# Patient Record
Sex: FEMALE | Race: WHITE | Employment: FULL TIME
[De-identification: names, ages, dates, MRNs, and addresses within clinical notes are randomized per-mention and may not be internally consistent; named-entity substitution may affect disease eponyms.]

---

## 2017-02-06 ENCOUNTER — HOSPITAL ENCOUNTER (OUTPATIENT)
Dept: PHYSICAL THERAPY | Facility: CLINIC | Age: 52
Setting detail: THERAPIES SERIES
End: 2017-02-06
Payer: COMMERCIAL

## 2017-02-06 ENCOUNTER — HOSPITAL ENCOUNTER (OUTPATIENT)
Dept: PHYSICAL THERAPY | Facility: CLINIC | Age: 52
Setting detail: THERAPIES SERIES
Discharge: HOME | End: 2017-02-06
Payer: COMMERCIAL

## 2017-02-06 PROCEDURE — 97113 AQUATIC THERAPY/EXERCISES: CPT

## 2017-02-08 ENCOUNTER — HOSPITAL ENCOUNTER (OUTPATIENT)
Dept: PHYSICAL THERAPY | Facility: CLINIC | Age: 52
Setting detail: THERAPIES SERIES
Discharge: HOME | End: 2017-02-08
Payer: COMMERCIAL

## 2017-02-08 PROCEDURE — 97113 AQUATIC THERAPY/EXERCISES: CPT

## 2017-02-08 PROCEDURE — 97016 VASOPNEUMATIC DEVICE THERAPY: CPT

## 2017-02-08 PROCEDURE — 97530 THERAPEUTIC ACTIVITIES: CPT

## 2017-02-10 ENCOUNTER — HOSPITAL ENCOUNTER (OUTPATIENT)
Dept: PHYSICAL THERAPY | Facility: CLINIC | Age: 52
Setting detail: THERAPIES SERIES
Discharge: HOME | End: 2017-02-10
Payer: COMMERCIAL

## 2017-02-10 PROCEDURE — 97016 VASOPNEUMATIC DEVICE THERAPY: CPT

## 2017-02-10 PROCEDURE — 97110 THERAPEUTIC EXERCISES: CPT

## 2017-02-10 PROCEDURE — 97113 AQUATIC THERAPY/EXERCISES: CPT

## 2017-02-13 ENCOUNTER — HOSPITAL ENCOUNTER (OUTPATIENT)
Dept: PHYSICAL THERAPY | Facility: CLINIC | Age: 52
Setting detail: THERAPIES SERIES
Discharge: HOME | End: 2017-02-13
Payer: COMMERCIAL

## 2017-02-13 PROCEDURE — 97016 VASOPNEUMATIC DEVICE THERAPY: CPT

## 2017-02-13 PROCEDURE — 97113 AQUATIC THERAPY/EXERCISES: CPT

## 2017-02-13 PROCEDURE — 97110 THERAPEUTIC EXERCISES: CPT

## 2017-02-15 ENCOUNTER — HOSPITAL ENCOUNTER (OUTPATIENT)
Dept: PHYSICAL THERAPY | Facility: CLINIC | Age: 52
Setting detail: THERAPIES SERIES
Discharge: HOME | End: 2017-02-15
Payer: COMMERCIAL

## 2017-02-15 PROCEDURE — 97113 AQUATIC THERAPY/EXERCISES: CPT

## 2017-02-15 PROCEDURE — 97110 THERAPEUTIC EXERCISES: CPT

## 2017-02-15 PROCEDURE — 97530 THERAPEUTIC ACTIVITIES: CPT

## 2017-02-15 PROCEDURE — 97016 VASOPNEUMATIC DEVICE THERAPY: CPT

## 2017-02-17 ENCOUNTER — HOSPITAL ENCOUNTER (OUTPATIENT)
Dept: PHYSICAL THERAPY | Facility: CLINIC | Age: 52
Setting detail: THERAPIES SERIES
Discharge: HOME | End: 2017-02-17
Payer: COMMERCIAL

## 2017-02-17 PROCEDURE — 97016 VASOPNEUMATIC DEVICE THERAPY: CPT

## 2017-02-17 PROCEDURE — 97110 THERAPEUTIC EXERCISES: CPT

## 2017-02-17 PROCEDURE — 97113 AQUATIC THERAPY/EXERCISES: CPT

## 2017-02-20 ENCOUNTER — HOSPITAL ENCOUNTER (OUTPATIENT)
Dept: PHYSICAL THERAPY | Facility: CLINIC | Age: 52
Setting detail: THERAPIES SERIES
Discharge: HOME | End: 2017-02-20
Payer: COMMERCIAL

## 2017-02-20 PROCEDURE — 97113 AQUATIC THERAPY/EXERCISES: CPT

## 2017-02-20 PROCEDURE — G0283 ELEC STIM OTHER THAN WOUND: HCPCS

## 2017-02-20 PROCEDURE — 97016 VASOPNEUMATIC DEVICE THERAPY: CPT

## 2017-02-20 PROCEDURE — 97110 THERAPEUTIC EXERCISES: CPT

## 2017-03-07 ENCOUNTER — HOSPITAL ENCOUNTER (OUTPATIENT)
Dept: PHYSICAL THERAPY | Facility: CLINIC | Age: 52
Setting detail: THERAPIES SERIES
Discharge: HOME | End: 2017-03-07
Payer: COMMERCIAL

## 2017-03-07 PROCEDURE — 97016 VASOPNEUMATIC DEVICE THERAPY: CPT

## 2017-03-07 PROCEDURE — G0283 ELEC STIM OTHER THAN WOUND: HCPCS

## 2017-03-07 PROCEDURE — 97113 AQUATIC THERAPY/EXERCISES: CPT

## 2017-03-07 PROCEDURE — 97110 THERAPEUTIC EXERCISES: CPT

## 2017-03-09 ENCOUNTER — HOSPITAL ENCOUNTER (OUTPATIENT)
Dept: PHYSICAL THERAPY | Facility: CLINIC | Age: 52
Setting detail: THERAPIES SERIES
Discharge: HOME | End: 2017-03-09
Payer: COMMERCIAL

## 2017-03-09 PROCEDURE — 97110 THERAPEUTIC EXERCISES: CPT

## 2017-03-09 PROCEDURE — 97113 AQUATIC THERAPY/EXERCISES: CPT

## 2017-03-09 PROCEDURE — G0283 ELEC STIM OTHER THAN WOUND: HCPCS

## 2017-03-09 PROCEDURE — 97016 VASOPNEUMATIC DEVICE THERAPY: CPT

## 2017-03-14 ENCOUNTER — HOSPITAL ENCOUNTER (OUTPATIENT)
Dept: PHYSICAL THERAPY | Facility: CLINIC | Age: 52
Setting detail: THERAPIES SERIES
Discharge: HOME | End: 2017-03-14
Payer: COMMERCIAL

## 2017-03-14 PROCEDURE — 97110 THERAPEUTIC EXERCISES: CPT

## 2017-03-14 PROCEDURE — 97113 AQUATIC THERAPY/EXERCISES: CPT

## 2017-03-14 PROCEDURE — 97016 VASOPNEUMATIC DEVICE THERAPY: CPT

## 2017-03-16 ENCOUNTER — HOSPITAL ENCOUNTER (OUTPATIENT)
Dept: PHYSICAL THERAPY | Facility: CLINIC | Age: 52
Setting detail: THERAPIES SERIES
Discharge: HOME | End: 2017-03-16
Payer: COMMERCIAL

## 2017-03-16 PROCEDURE — 97110 THERAPEUTIC EXERCISES: CPT

## 2017-03-16 PROCEDURE — 97113 AQUATIC THERAPY/EXERCISES: CPT

## 2017-03-16 PROCEDURE — 97016 VASOPNEUMATIC DEVICE THERAPY: CPT

## 2017-03-21 ENCOUNTER — HOSPITAL ENCOUNTER (OUTPATIENT)
Dept: PHYSICAL THERAPY | Facility: CLINIC | Age: 52
Setting detail: THERAPIES SERIES
Discharge: HOME | End: 2017-03-21
Payer: COMMERCIAL

## 2017-03-21 PROCEDURE — 97110 THERAPEUTIC EXERCISES: CPT

## 2017-03-21 PROCEDURE — 97016 VASOPNEUMATIC DEVICE THERAPY: CPT

## 2017-03-21 PROCEDURE — 97113 AQUATIC THERAPY/EXERCISES: CPT

## 2017-03-23 ENCOUNTER — HOSPITAL ENCOUNTER (OUTPATIENT)
Dept: PHYSICAL THERAPY | Facility: CLINIC | Age: 52
Setting detail: THERAPIES SERIES
End: 2017-03-23
Payer: COMMERCIAL

## 2017-03-23 ENCOUNTER — APPOINTMENT (OUTPATIENT)
Dept: PHYSICAL THERAPY | Facility: CLINIC | Age: 52
End: 2017-03-23
Payer: COMMERCIAL

## 2017-03-28 ENCOUNTER — HOSPITAL ENCOUNTER (OUTPATIENT)
Dept: PHYSICAL THERAPY | Facility: CLINIC | Age: 52
Setting detail: THERAPIES SERIES
Discharge: HOME | End: 2017-03-28
Payer: COMMERCIAL

## 2017-03-28 PROCEDURE — 97110 THERAPEUTIC EXERCISES: CPT

## 2017-03-28 PROCEDURE — 97113 AQUATIC THERAPY/EXERCISES: CPT

## 2017-03-28 PROCEDURE — 97016 VASOPNEUMATIC DEVICE THERAPY: CPT

## 2017-03-30 ENCOUNTER — HOSPITAL ENCOUNTER (OUTPATIENT)
Dept: PHYSICAL THERAPY | Facility: CLINIC | Age: 52
Setting detail: THERAPIES SERIES
Discharge: HOME | End: 2017-03-30
Payer: COMMERCIAL

## 2017-03-30 PROCEDURE — 97110 THERAPEUTIC EXERCISES: CPT

## 2017-03-30 PROCEDURE — 97016 VASOPNEUMATIC DEVICE THERAPY: CPT

## 2017-03-30 PROCEDURE — 97035 APP MDLTY 1+ULTRASOUND EA 15: CPT

## 2017-03-30 PROCEDURE — 97113 AQUATIC THERAPY/EXERCISES: CPT

## 2017-05-08 ENCOUNTER — HOSPITAL ENCOUNTER (OUTPATIENT)
Dept: MAMMOGRAPHY | Age: 52
Discharge: HOME | End: 2017-05-08
Payer: COMMERCIAL

## 2017-05-08 DIAGNOSIS — Z12.31 VISIT FOR SCREENING MAMMOGRAM: ICD-10-CM

## 2017-05-08 PROCEDURE — 77063 BREAST TOMOSYNTHESIS BI: CPT

## 2017-05-26 ENCOUNTER — HOSPITAL ENCOUNTER (OUTPATIENT)
Dept: ULTRASOUND IMAGING | Age: 52
Discharge: HOME | End: 2017-05-26
Payer: COMMERCIAL

## 2017-05-26 ENCOUNTER — HOSPITAL ENCOUNTER (OUTPATIENT)
Dept: MAMMOGRAPHY | Age: 52
Discharge: HOME | End: 2017-05-26
Payer: COMMERCIAL

## 2017-05-26 DIAGNOSIS — R92.8 ABNORMAL MAMMOGRAM: ICD-10-CM

## 2017-05-26 PROCEDURE — G0279 TOMOSYNTHESIS, MAMMO: HCPCS

## 2017-08-11 ENCOUNTER — HOSPITAL ENCOUNTER (EMERGENCY)
Age: 52
Discharge: HOME | End: 2017-08-11
Attending: SPECIALIST
Payer: COMMERCIAL

## 2017-08-11 VITALS
OXYGEN SATURATION: 98 % | TEMPERATURE: 98.1 F | WEIGHT: 190 LBS | SYSTOLIC BLOOD PRESSURE: 122 MMHG | BODY MASS INDEX: 32.44 KG/M2 | DIASTOLIC BLOOD PRESSURE: 79 MMHG | HEART RATE: 79 BPM | HEIGHT: 64 IN | RESPIRATION RATE: 18 BRPM

## 2017-08-11 DIAGNOSIS — S39.012A LOW BACK STRAIN, INITIAL ENCOUNTER: Primary | ICD-10-CM

## 2017-08-11 PROCEDURE — 99282 EMERGENCY DEPT VISIT SF MDM: CPT

## 2017-08-11 PROCEDURE — 96372 THER/PROPH/DIAG INJ SC/IM: CPT

## 2017-08-11 PROCEDURE — 6360000002 HC RX W HCPCS: Performed by: SPECIALIST

## 2017-08-11 RX ORDER — KETOROLAC TROMETHAMINE 30 MG/ML
60 INJECTION, SOLUTION INTRAMUSCULAR; INTRAVENOUS ONCE
Status: COMPLETED | OUTPATIENT
Start: 2017-08-11 | End: 2017-08-11

## 2017-08-11 RX ORDER — CYCLOBENZAPRINE HCL 10 MG
10 TABLET ORAL 3 TIMES DAILY PRN
Qty: 15 TABLET | Refills: 0 | Status: SHIPPED | OUTPATIENT
Start: 2017-08-11 | End: 2017-08-21

## 2017-08-11 RX ORDER — ACETAMINOPHEN 160 MG
TABLET,DISINTEGRATING ORAL
COMMUNITY

## 2017-08-11 RX ORDER — IBUPROFEN 600 MG/1
600 TABLET ORAL EVERY 6 HOURS PRN
Qty: 30 TABLET | Refills: 0 | Status: SHIPPED | OUTPATIENT
Start: 2017-08-11

## 2017-08-11 RX ORDER — ORPHENADRINE CITRATE 30 MG/ML
60 INJECTION INTRAMUSCULAR; INTRAVENOUS ONCE
Status: COMPLETED | OUTPATIENT
Start: 2017-08-11 | End: 2017-08-11

## 2017-08-11 RX ORDER — PREDNISONE 50 MG/1
50 TABLET ORAL DAILY
Qty: 4 TABLET | Refills: 0 | Status: SHIPPED | OUTPATIENT
Start: 2017-08-11

## 2017-08-11 RX ADMIN — KETOROLAC TROMETHAMINE 60 MG: 30 INJECTION, SOLUTION INTRAMUSCULAR at 09:06

## 2017-08-11 RX ADMIN — ORPHENADRINE CITRATE 60 MG: 30 INJECTION INTRAMUSCULAR; INTRAVENOUS at 09:03

## 2017-08-11 ASSESSMENT — PAIN DESCRIPTION - LOCATION: LOCATION: BACK

## 2017-08-11 ASSESSMENT — ENCOUNTER SYMPTOMS: BACK PAIN: 1

## 2017-08-11 ASSESSMENT — PAIN SCALES - GENERAL
PAINLEVEL_OUTOF10: 8
PAINLEVEL_OUTOF10: 8

## 2017-08-11 ASSESSMENT — PAIN DESCRIPTION - PAIN TYPE: TYPE: ACUTE PAIN

## 2017-08-11 ASSESSMENT — PAIN DESCRIPTION - ORIENTATION: ORIENTATION: LEFT;LOWER

## 2017-08-11 ASSESSMENT — PAIN DESCRIPTION - DESCRIPTORS: DESCRIPTORS: SHARP;CONSTANT

## 2017-09-29 ENCOUNTER — HOSPITAL ENCOUNTER (EMERGENCY)
Age: 52
Discharge: HOME | End: 2017-09-29
Attending: EMERGENCY MEDICINE
Payer: COMMERCIAL

## 2017-09-29 VITALS
WEIGHT: 190 LBS | HEART RATE: 67 BPM | OXYGEN SATURATION: 93 % | SYSTOLIC BLOOD PRESSURE: 140 MMHG | BODY MASS INDEX: 32.44 KG/M2 | DIASTOLIC BLOOD PRESSURE: 66 MMHG | TEMPERATURE: 97.9 F | RESPIRATION RATE: 16 BRPM | HEIGHT: 64 IN

## 2017-09-29 DIAGNOSIS — H10.32 ACUTE CONJUNCTIVITIS OF LEFT EYE, UNSPECIFIED ACUTE CONJUNCTIVITIS TYPE: Primary | ICD-10-CM

## 2017-09-29 PROCEDURE — 6370000000 HC RX 637 (ALT 250 FOR IP): Performed by: EMERGENCY MEDICINE

## 2017-09-29 PROCEDURE — 99282 EMERGENCY DEPT VISIT SF MDM: CPT

## 2017-09-29 RX ORDER — OLOPATADINE HYDROCHLORIDE 1 MG/ML
1 SOLUTION/ DROPS OPHTHALMIC 2 TIMES DAILY
Qty: 1 BOTTLE | Refills: 0 | Status: SHIPPED | OUTPATIENT
Start: 2017-09-29 | End: 2017-10-04

## 2017-09-29 RX ORDER — SULFACETAMIDE SODIUM 100 MG/ML
2 SOLUTION/ DROPS OPHTHALMIC
Qty: 1 BOTTLE | Refills: 0 | Status: SHIPPED | OUTPATIENT
Start: 2017-09-29 | End: 2017-10-04

## 2017-09-29 RX ADMIN — FLUORESCEIN SODIUM 1 MG: 1 STRIP OPHTHALMIC at 14:15

## 2017-09-29 ASSESSMENT — PAIN DESCRIPTION - DESCRIPTORS: DESCRIPTORS: BURNING;DISCOMFORT;ITCHING

## 2017-09-29 ASSESSMENT — PAIN SCALES - GENERAL: PAINLEVEL_OUTOF10: 6

## 2017-09-29 ASSESSMENT — PAIN DESCRIPTION - LOCATION: LOCATION: EYE

## 2017-09-29 ASSESSMENT — PAIN DESCRIPTION - PAIN TYPE: TYPE: ACUTE PAIN

## 2017-09-29 NOTE — ED AVS SNAPSHOT
It is important to keep your appointments. Please bring your current insurance card, photo ID, co-pay, and all medication bottles to your appointment. If self-pay, payment is expected at the time of service. Follow-up Information     Follow up with Jacobo Must, DO. Schedule an appointment as soon as possible for a visit in 3 days. Specialty:  Family Medicine    Why:  for persistence or worsening of symptoms    Contact information:    Frank [address]  [phone]          Go to [facility] ED. Specialty:  Emergency Medicine    Why:  for persistence or worsening of symptoms, increased redness/swelling/bleeding/pain or pus. Contact information:    [address] [facility]  [phone]      Preventive Care        Date Due    Hepatitis C screening is recommended for all adults regardless of risk factors born between [facility] at least once (lifetime) who have never been tested. 1965    HIV screening is recommended for all people regardless of risk factors  aged 15-65 years at least once (lifetime) who have never been HIV tested. 1/22/1979    Tetanus Combination Vaccine (1 - Tdap) 1/22/1983    Pap Smear 1/22/1985    Cholesterol Screening 1/22/2004    Diabetes Screening 1/22/2004    Colonoscopy 1/22/2014    Yearly Flu Vaccine (1) 9/1/2017    Mammograms are recommended every 2 years for low/average risk patients aged 48 - 69, and every year for high risk patients per updated national guidelines. However these guidelines can be individualized by your provider. 5/26/2019                 Care Plan Once You Return Home    This section includes instructions you will need to follow once you leave the hospital.  Your care team will discuss these with you, so you and those caring for you know how to best care for your health needs at home.   This section may also include educational information about certain health topics that may be of help to you. Important Information if you smoke or are exposed to smoking       SMOKING: QUIT SMOKING. THIS IS THE MOST IMPORTANT ACTION YOU CAN TAKE TO IMPROVE YOUR CURRENT AND FUTURE HEALTH. Call the [facility] [place] NOW ([phone])    Smoking harms nonsmokers. When nonsmokers are around people who smoke, they absorb nicotine, carbon monoxide, and other ingredients of tobacco smoke. DO NOT SMOKE AROUND CHILDREN     Children exposed to secondhand smoke are at an increased risk of:  Sudden Infant Death Syndrome (SIDS), acute respiratory infections, inflammation of the middle ear, and severe asthma. Over a longer time, it causes heart disease and lung cancer. There is no safe level of exposure to secondhand smoke. MyChart Signup     TechLo[employer]er allows you to send messages to your doctor, view your test results, renew your prescriptions, schedule appointments, view visit notes, and more. How Do I Sign Up? 1. In your Internet browser, go to [email or web address]  2. Click on the Sign Up Now link in the Sign In box. You will see the New Member Sign Up page. 3. Enter your TechLo[employer] Access Code exactly as it appears below. You will not need to use this code after youve completed the sign-up process. If you do not sign up before the expiration date, you must request a new code. Tech[employer] Access Code: [email or web address]  Expires: 10/10/2017  9:34 AM    4. Enter your Social Security Number ([id number]) and Date of Birth (mm/dd/yyyy) as indicated and click Submit. You will be taken to the next sign-up page. 5. Create a [employer] ID. This will be your TechLoaner login ID and cannot be changed, so think of one that is secure and easy to remember. 6. Create a [employer] password. You can change your password at any time. 7. Enter your Password Reset Question and Answer.  This can be used at a later time if you forget your password. 8. Enter your e-mail address. You will receive e-mail notification when new information is available in [address] 9. Click Sign Up. You can now view your medical record. Additional Information  If you have questions, please contact the physician practice where you receive care. Remember, MyChart is NOT to be used for urgent needs. For medical emergencies, dial 911. For questions regarding your MyChart account call [phone]. If you have a clinical question, please call your doctor's office. View your information online  ? Review your current list of  medications, immunization, and allergies. ? Review your future test results online . ? Review your discharge instructions provided by your caregivers at discharge    Certain functionality such as prescription refills, scheduling appointments or sending messages to your provider are not activated if your provider does not use CareChatty in his/her office    For questions regarding your MyChart account call [phone]. If you have a clinical question, please call your doctor's office. The information on all pages of the After Visit Summary has been reviewed with me, the patient and/or responsible adult, by my health care provider(s). I had the opportunity to ask questions regarding this information. I understand I should dispose of my armband safely at home to protect my health information. A complete copy of the After Visit Summary has been given to me, the patient and/or responsible adult.          Patient Signature/Responsible Adult: ___________________________________    Nurse Signature: ___________________________________  Resident/MLP Signature: ___________________________________  Attending Signature: ___________________________________    Date:____________Time:____________              Discharge Instructions            Pinkeye: Care Instructions  Your Care Instructions ¨ Close your eye for 30 to 60 seconds to let the drops or ointment move around. ¨ Do not touch the ointment or dropper tip to your eyelashes or any other surface. · Do not share towels, pillows, or washcloths while you have pinkeye. When should you call for help? Call your doctor now or seek immediate medical care if:  · You have pain in your eye, not just irritation on the surface. · You have a change in vision or loss of vision. · You have an increase in discharge from the eye. · Your eye has not started to improve or begins to get worse within 48 hours after you start using antibiotics. · Pinkeye lasts longer than 7 days. Watch closely for changes in your health, and be sure to contact your doctor if you have any problems. Where can you learn more? Go to [email or web address] and sign in to your [employer] account. Enter Y392 in the Amerityre box to learn more about \"Pinkeye: Care Instructions. \"     If you do not have an account, please click on the \"Sign Up Now\" link. Current as of: March 20, 2017  Content Version: 11.3  © 1162-6416 [employer]. Care instructions adapted under license by [facility] ([facility]). If you have questions about a medical condition or this instruction, always ask your healthcare professional. [address] any warranty or liability for your use of this information.

## 2017-09-29 NOTE — ED PROVIDER NOTES
Pain    LEVOTHYROXINE (SYNTHROID) 25 MCG TABLET    Take 25 mcg by mouth Daily    PREDNISONE (DELTASONE) 50 MG TABLET    Take 1 tablet by mouth daily    SERTRALINE (ZOLOFT) 50 MG TABLET    Take 50 mg by mouth daily    SUMATRIPTAN SUCCINATE (IMITREX PO)    Take by mouth       ALLERGIES     has No Known Allergies. FAMILY HISTORY     has no family status information on file. family history is not on file. SOCIAL HISTORY      reports that she has never smoked. She has never used smokeless tobacco. She reports that she drinks alcohol. She reports that she does not use illicit drugs. PHYSICAL EXAM     INITIAL VITALS:  height is 5' 4\" (1.626 m) and weight is 86.2 kg (190 lb). Her oral temperature is 97.9 °F (36.6 °C). Her blood pressure is 140/66 (abnormal) and her pulse is 67. Her respiration is 16 and oxygen saturation is 93%. The patient is noted have some injection of the left eye. The pupils equal, round, reactive to light. Fluoroscene stain is applied to the eye and using ultraviolet light. There is no dye uptake appreciated. Examination is consistent with a conjunctivitis      DIAGNOSTIC RESULTS         LABS:  No results found for this visit on 09/29/17. EMERGENCY DEPARTMENT COURSE:   Vitals:    Vitals:    09/29/17 1355   BP: (!) 140/66   Pulse: 67   Resp: 16   Temp: 97.9 °F (36.6 °C)   TempSrc: Oral   SpO2: 93%   Weight: 86.2 kg (190 lb)   Height: 5' 4\" (1.626 m)     -------------------------  BP: (!) 140/66, Temp: 97.9 °F (36.6 °C), Pulse: 67, Resp: 16      PERTINENT ATTENDING PHYSICIAN COMMENTS:    The patient presents with a conjunctivitis.   No other abnormalities with this I do feel able to be followed up as an outpatient I'm recommending to return to the ER for worsening symptoms, pain, decreasing vision, or other concerns otherwise she may apply warm compresses to the affected eye and should follow-up with her family doctor or with ophthalmology within the next few days    (Please

## 2017-09-29 NOTE — ED PROVIDER NOTES
reports that she does not use illicit drugs. Family History: None  Psychiatric History: None    Allergies:has No Known Allergies. PHYSICAL EXAM     INITIAL VITALS: BP (!) 140/66  Pulse 67  Temp 97.9 °F (36.6 °C) (Oral)   Resp 16  Ht 5' 4\" (1.626 m)  Wt 86.2 kg (190 lb)  SpO2 93%  BMI 32.61 kg/m2  Constitutional:  Well developed   Eyes:  Mild generalized left conjunctivitis w/o chemosis/blepharitis. Anterior chamber intact/clear bilaterally. No discharge/tearing/photophobia. Pupils equal and readily reactive to light, EOMI. HENT:  Atraumatic, external ears normal, nose normal, oropharynx moist. No posterior pharynx edema/erythema. Neck- supple, no lymphadenopathy. Respiratory:  Clear to auscultation bilaterally with good air exchange, no W/R/R  Cardiovascular:  RRR with normal S1 and S2  Integument:   No rash. Neurologic:  Alert & appropriate, no focal deficits noted       DIAGNOSTIC RESULTS     EKG: All EKG's are interpreted by the Emergency Department Physician who either signs or Co-signs this chart in the absence of a cardiologist.  Not indicated    RADIOLOGY:   Reviewed the radiologist:  No orders to display     Not indicated    LABS:  Labs Reviewed - No data to display  Not indicated        EMERGENCY DEPARTMENT COURSE:     2:11 PM  Suspect allergic v viral source, giving Patanol and Bleph10 rxs. Directed her to use Patanol first, if symptoms worsen then start [address] [place] [address] for bacteral coverage. Pt agreeable to plan. No fluorescein staining, Woods or Slit lamp exam indicated. Orders Placed This Encounter   Medications    olopatadine (PATANOL) 0.1 % ophthalmic solution     Sig: Place 1 drop into both eyes 2 times daily for 5 days     Dispense:  1 Bottle     Refill:  0    sulfacetamide (BLEPH-10) 10 % ophthalmic solution     Sig: Place 2 drops into both eyes every 3 hours for 5 days     Dispense:  1 Bottle     Refill:  0       CONSULTS:  None      FINAL IMPRESSION      1.  Acute conjunctivitis of left eye, unspecified acute conjunctivitis type          DISPOSITION/PLAN:  DISPOSITION Decision to Discharge      PATIENT REFERRED TO:  DO Frank Krause [address]  [phone]    Schedule an appointment as soon as possible for a visit in 3 days  for persistence or worsening of symptoms    [facility] ED  [address] [id number]  [phone]  Go to  for persistence or worsening of symptoms, increased redness/swelling/bleeding/pain or pus.       DISCHARGE MEDICATIONS:  New Prescriptions    OLOPATADINE (PATANOL) 0.1 % OPHTHALMIC SOLUTION    Place 1 drop into both eyes 2 times daily for 5 days    SULFACETAMIDE (BLEPH-10) 10 % OPHTHALMIC SOLUTION    Place 2 drops into both eyes every 3 hours for 5 days       (Please note that portions of this note were completed with a voice recognition program.  Efforts were made to edit the dictations but occasionally words are mis-transcribed.)    BERNARD Dahl PA-C  09/29/17 3775

## 2018-01-16 ENCOUNTER — HOSPITAL ENCOUNTER (EMERGENCY)
Age: 53
Discharge: HOME | End: 2018-01-16
Attending: EMERGENCY MEDICINE
Payer: COMMERCIAL

## 2018-01-16 ENCOUNTER — APPOINTMENT (OUTPATIENT)
Dept: GENERAL RADIOLOGY | Age: 53
End: 2018-01-16
Payer: COMMERCIAL

## 2018-01-16 VITALS
HEART RATE: 119 BPM | DIASTOLIC BLOOD PRESSURE: 50 MMHG | OXYGEN SATURATION: 97 % | HEIGHT: 64 IN | BODY MASS INDEX: 32.44 KG/M2 | RESPIRATION RATE: 18 BRPM | WEIGHT: 190 LBS | TEMPERATURE: 98.2 F | SYSTOLIC BLOOD PRESSURE: 103 MMHG

## 2018-01-16 DIAGNOSIS — J20.9 ACUTE BRONCHITIS, UNSPECIFIED ORGANISM: Primary | ICD-10-CM

## 2018-01-16 LAB
DIRECT EXAM: NORMAL
Lab: NORMAL
SPECIMEN DESCRIPTION: NORMAL
STATUS: NORMAL

## 2018-01-16 PROCEDURE — 87804 INFLUENZA ASSAY W/OPTIC: CPT

## 2018-01-16 PROCEDURE — 71046 X-RAY EXAM CHEST 2 VIEWS: CPT

## 2018-01-16 PROCEDURE — 99283 EMERGENCY DEPT VISIT LOW MDM: CPT

## 2018-01-16 PROCEDURE — 6370000000 HC RX 637 (ALT 250 FOR IP): Performed by: PHYSICIAN ASSISTANT

## 2018-01-16 RX ORDER — ALBUTEROL SULFATE 90 UG/1
2 AEROSOL, METERED RESPIRATORY (INHALATION) ONCE
Status: COMPLETED | OUTPATIENT
Start: 2018-01-16 | End: 2018-01-16

## 2018-01-16 RX ORDER — AZITHROMYCIN 250 MG/1
TABLET, FILM COATED ORAL
Qty: 1 PACKET | Refills: 0 | Status: SHIPPED | OUTPATIENT
Start: 2018-01-16 | End: 2020-01-09

## 2018-01-16 RX ORDER — BENZONATATE 100 MG/1
100 CAPSULE ORAL 3 TIMES DAILY PRN
Qty: 30 CAPSULE | Refills: 0 | Status: SHIPPED | OUTPATIENT
Start: 2018-01-16 | End: 2020-01-09

## 2018-01-16 RX ADMIN — ALBUTEROL SULFATE 2 PUFF: 90 AEROSOL, METERED RESPIRATORY (INHALATION) at 12:10

## 2018-01-16 ASSESSMENT — ENCOUNTER SYMPTOMS
NAUSEA: 1
SHORTNESS OF BREATH: 1
VOMITING: 0
ABDOMINAL PAIN: 0
SORE THROAT: 1
EYE DISCHARGE: 0
BACK PAIN: 0
COUGH: 1
EYE REDNESS: 0

## 2018-01-16 NOTE — ED PROVIDER NOTES
[facility] ED  [address] [facility] [address]  Phone: [phone]  Fax: [phone]      Pt Name: Joyce Julio  MRN: [id number]  [place] 1965  Date of evaluation: 1/16/2018      CHIEF COMPLAINT       Chief Complaint   Patient presents with    Cough       HISTORY OF PRESENT ILLNESS   (Location, Quality, Severity, Duration, Timing, Context, Modifying Factors, Associated Signs and Symptoms)     Joyce Julio is a 47 y.o. female who presents to the ER for evaluation of cough. Patient states that she has had a cough for approximately 2 weeks. She states that her illness started with a sore throat. Her cough is dry. She states that she has felt hot followed by chills, but no documented fevers at home. With the past few days she is feeling somewhat short of breath. She does have discomfort in her chest with coughing. She is a nonsmoker. No prior history of syncope or asthma. Patient did not receive an influenza vaccine. She has had some intermittent nausea, but no vomiting. She denies abdominal pain. She states that she recently started on Adipex for weight loss. She is also on Tremfya injections for her psoriasis. Patient has been using over-the-counter Tylenol, [place] and Mucinex with no relief. Patient denies acute pain at the current time. Nursing Notes were reviewed. REVIEW OF SYSTEMS     (2-9 systems for level 4, 10 or more for level 5)    Review of Systems   Constitutional: Positive for chills. Fevers. HENT: Positive for sore throat. Negative for ear discharge and ear pain. Eyes: Negative for discharge and redness. Respiratory: Positive for cough and shortness of breath. Cardiovascular: Negative for palpitations and leg swelling. Gastrointestinal: Positive for nausea. Negative for abdominal pain and vomiting. Genitourinary: Negative for dysuria and frequency. Musculoskeletal: Negative for back pain and neck pain.    Skin:
extremity pain or swelling   Neurologic: Moving all 4 extremities without difficulty there are no gross focal neurologic deficits   Skin: Warm and dry       Physical Exam  DIAGNOSTIC RESULTS     EKG: All EKG's are interpreted by the Emergency Department Physician who either signs or Co-signs this chart in the absence of a cardiologist.    Not indicated unless otherwise documented above or in the midlevel documentation    LABS:  Results for orders placed or performed during the hospital encounter of 01/16/18   Rapid Influenza A/B Antigens   Result Value Ref Range    Specimen Description . NASOPHARYNGEAL SWAB     Special Requests NOT REPORTED     Direct Exam PRESUMPTIVE NEGATIVE for Influenza A + B antigens. Direct Exam       PCR testing to confirm this result is available upon request.  Specimen will be    Direct Exam        saved in the laboratory for 7 days. Please call [phone] if PCR testing is    Direct Exam  indicated. Direct Exam       Performed at [address] [facility] Emergency Dept and [address] [facility], [address] [place] [address], [facility] [address]     Status FINAL 01/16/2018        Not indicated unless otherwise documented above or in the midlevel documentation    RADIOLOGY:   I reviewed the radiologist interpretations:  XR CHEST STANDARD (2 VW)   Final Result   No acute abnormality. Not indicated unless otherwise documented above or in the midlevel documentation    EMERGENCY DEPARTMENT COURSE:       PERTINENT ATTENDING PHYSICIAN COMMENTS:    2 weeks of cough being treated over-the-counter without relief. No fevers. No chest pain just soreness with coughing.  Chest x-ray and influenza            Gaby Bustamante, DO  01/16/18 7694

## 2019-03-14 ENCOUNTER — HOSPITAL ENCOUNTER (OUTPATIENT)
Dept: PHYSICAL THERAPY | Facility: CLINIC | Age: 54
Setting detail: THERAPIES SERIES
Discharge: HOME | End: 2019-03-14
Payer: COMMERCIAL

## 2019-03-14 PROCEDURE — 97161 PT EVAL LOW COMPLEX 20 MIN: CPT

## 2019-03-14 PROCEDURE — 97110 THERAPEUTIC EXERCISES: CPT

## 2019-03-21 ENCOUNTER — HOSPITAL ENCOUNTER (OUTPATIENT)
Dept: PHYSICAL THERAPY | Facility: CLINIC | Age: 54
Setting detail: THERAPIES SERIES
Discharge: HOME | End: 2019-03-21
Payer: COMMERCIAL

## 2019-03-21 PROCEDURE — 97140 MANUAL THERAPY 1/> REGIONS: CPT

## 2019-03-21 PROCEDURE — 97110 THERAPEUTIC EXERCISES: CPT

## 2019-03-28 ENCOUNTER — HOSPITAL ENCOUNTER (OUTPATIENT)
Dept: PHYSICAL THERAPY | Facility: CLINIC | Age: 54
Setting detail: THERAPIES SERIES
Discharge: HOME | End: 2019-03-28
Payer: COMMERCIAL

## 2019-03-28 PROCEDURE — 97140 MANUAL THERAPY 1/> REGIONS: CPT

## 2019-03-28 PROCEDURE — 97110 THERAPEUTIC EXERCISES: CPT

## 2019-04-03 ENCOUNTER — HOSPITAL ENCOUNTER (OUTPATIENT)
Dept: PHYSICAL THERAPY | Facility: CLINIC | Age: 54
Setting detail: THERAPIES SERIES
Discharge: HOME | End: 2019-04-03
Payer: COMMERCIAL

## 2019-04-03 PROCEDURE — 97110 THERAPEUTIC EXERCISES: CPT

## 2019-04-03 PROCEDURE — 97140 MANUAL THERAPY 1/> REGIONS: CPT

## 2019-04-03 NOTE — FLOWSHEET NOTE
[] [address]  [address]  [phone]  F: [phone] [] [address]  [facility]   [address]  P: [phone]  F: [phone] [x] [address]  Therapy  [address]  P: [phone]  F: [phone] [] [address]  [facility]   [address]  [place]: [phone]  F: [phone]     Physical Therapy Daily Treatment Note    Date:  4/3/2019  Patient Name:  Tasia Hickey    :  1965  MRN: [id number]   Physician:Mingo Clinton                                 Insurance: [employer]   Medical Diagnosis: Lt. Sciatica M54.32       Rehab Codes: MM Weakness, Postural deficits  Onset Date: Dec 2018                       Next 's appt.:  End of April  Visit# / total visits:  Cancels/No Shows: 0/0    Subjective:    Pain:  [x] Yes  [] No Location: LBP only Pain Rating: (0-10 scale) 6/10  Pain altered Tx:  [] No  [] Yes  Action:  Comments:Patient reports no LE s/s only LBP    Objective:  Modalities:   Precautions:  Exercises:  Exercise Reps/ Time Weight/ Level Comments   HEP Review      Manual Piriformis stretch 2x60\"  Supine   Frog leg Bridge  2x10     Bracing sequence 10x10\"  each  3 sets   Modified squirrel 20x10\" 5\"hold Pillow under hips   Alphabet w/ ball between knees x2     Isometric posture hold x20 black Side stepping   Isometric wt. reach x20 6#    Arm circles 2x10 yellow    Pull aparts 2x10 yellow    Clam shell 30 degrees 2x10 orange    Clam shell 90 degrees 2x10 orange    Other:  Manual:  L Piriformis release  L  Psoas release  L Iliacus release  Supine MET shotgun for pubic shear  Supine MET to correct L anterior innominate rotation  FRSL T3-6 MET    Specific Instructions for next treatment: Address alignment issues and progress strengthening as able Treatment Charges: Mins Units   []  Modalities     [x]  Ther Exercise 25 2   [x]  Manual Therapy 15 1   []  Ther Activities     []  Aquatics     []  Vasocompression     []  Other     Total Treatment time 45 3       Assessment: [x] Progressing toward goals. [] No change. [x] Other: Patient demonstrated difficulty maintaining thoracic extension with postural stability exercises. Added FRSL MET to improve thoracic extension patient  Then able to demonstrate ability to raise UE without compensatory patterns and maintain neutral alignment. Patient reported feeling much better after treatment and without pain  STG: (to be met in 6 treatments)  1. ? Pain: 3/10  2. ? ROM:  3. ? Strength:  4. ? Function: Patient to demonstrate the ability to sit and maintain neutral posture  5. Independent with Home Exercise Programs  6. Demonstrate Knowledge of fall prevention  LTG: (to be met in 12 treatments)  1. 0/10 LBP with ambulation and functional activities  2. Patient to demonstrate ability to complete functional activities without LE s/s  3. Patient to report functional disability less than 10% on Modified Oswestry        Patient goals: To relieve pain and tingling    Pt. Education:  [x] Yes  [] No  [x] Reviewed Prior HEP/Ed  Method of Education: [x] Verbal  [] Demo  [] Written  Comprehension of Education:  [] Verbalizes understanding. [] Demonstrates understanding. [] Needs review. [] Demonstrates/verbalizes HEP/Ed previously given. Plan: [x] Continue per plan of care.    [] Other:      Time In:100        Time Out: 0518    Electronically signed by:  Anotn Peng PT

## 2019-04-09 ENCOUNTER — HOSPITAL ENCOUNTER (OUTPATIENT)
Dept: PHYSICAL THERAPY | Facility: CLINIC | Age: 54
Setting detail: THERAPIES SERIES
Discharge: HOME | End: 2019-04-09
Payer: COMMERCIAL

## 2020-01-09 ENCOUNTER — HOSPITAL ENCOUNTER (EMERGENCY)
Age: 55
Discharge: HOME | End: 2020-01-09
Attending: EMERGENCY MEDICINE
Payer: COMMERCIAL

## 2020-01-09 VITALS
HEIGHT: 64 IN | TEMPERATURE: 98.1 F | BODY MASS INDEX: 32.44 KG/M2 | SYSTOLIC BLOOD PRESSURE: 130 MMHG | OXYGEN SATURATION: 96 % | DIASTOLIC BLOOD PRESSURE: 82 MMHG | RESPIRATION RATE: 16 BRPM | HEART RATE: 97 BPM | WEIGHT: 190 LBS

## 2020-01-09 PROCEDURE — 99282 EMERGENCY DEPT VISIT SF MDM: CPT

## 2020-01-09 PROCEDURE — 6370000000 HC RX 637 (ALT 250 FOR IP): Performed by: EMERGENCY MEDICINE

## 2020-01-09 RX ORDER — AMOXICILLIN 500 MG/1
500 TABLET, FILM COATED ORAL 3 TIMES DAILY
Qty: 21 TABLET | Refills: 0 | Status: SHIPPED | OUTPATIENT
Start: 2020-01-09 | End: 2020-01-16

## 2020-01-09 RX ORDER — AMOXICILLIN 250 MG/1
500 CAPSULE ORAL ONCE
Status: COMPLETED | OUTPATIENT
Start: 2020-01-09 | End: 2020-01-09

## 2020-01-09 RX ADMIN — AMOXICILLIN 500 MG: 250 CAPSULE ORAL at 21:46

## 2020-01-09 ASSESSMENT — PAIN DESCRIPTION - ORIENTATION: ORIENTATION: RIGHT;LEFT

## 2020-01-09 ASSESSMENT — PAIN DESCRIPTION - LOCATION: LOCATION: EAR

## 2020-01-09 ASSESSMENT — PAIN SCALES - GENERAL: PAINLEVEL_OUTOF10: 7

## 2020-01-09 ASSESSMENT — PAIN DESCRIPTION - PAIN TYPE: TYPE: ACUTE PAIN

## 2020-01-10 NOTE — ED PROVIDER NOTES
ALLERGIES     has No Known Allergies. FAMILY HISTORY     has no family status information on file. family history is not on file. SOCIAL HISTORY      reports that she has never smoked. She has never used smokeless tobacco. She reports current alcohol use. She reports that she does not use drugs. PHYSICAL EXAM     INITIAL VITALS:  height is 5' 4\" (1.626 m) and weight is 86.2 kg (190 lb). Her oral temperature is 98.1 °F (36.7 °C). Her blood pressure is 130/82 and her pulse is 97. Her respiration is 16 and oxygen saturation is 96%. Physical Exam   Constitutional: She appears well-developed and well-nourished. Non-toxic appearance. She does not appear ill. No distress. HENT:   Head: Normocephalic and atraumatic. Right Ear: External ear and ear canal normal. Tympanic membrane is erythematous. Left Ear: Tympanic membrane, external ear and ear canal normal. Tympanic membrane is not erythematous. Nose: Nose normal.   Mouth/Throat: Uvula is midline, oropharynx is clear and moist and mucous membranes are normal. No oropharyngeal exudate, posterior oropharyngeal edema or tonsillar abscesses. Right TM is erythematous. Slightly decreased light reflex. TM intact. Left side is normal.  No trismus or tongue elevation. Posterior pharynx normal.  Otherwise unremarkable HEENT exam.   Eyes: EOM and lids are normal.   Neck: No tracheal deviation present. Cardiovascular: Normal rate and regular rhythm. Pulmonary/Chest: Effort normal and breath sounds normal. No respiratory distress. Abdominal: Soft. There is no tenderness. Neurological: She is alert. GCS eye subscore is 4. GCS verbal subscore is 5. GCS motor subscore is 6. Skin: Skin is warm and dry. Psychiatric: She has a normal mood and affect. Her speech is normal.   Vitals reviewed. DIFFERENTIAL DIAGNOSIS/ MDM:     Plan will be to put the patient on amoxicillin. Clinically she appears well and otherwise nontoxic or septic. She was advised to follow-up with her PCP return right away if worsening or for any new or concerning symptoms. She is comfortable with the plan. I have reviewed the disposition diagnosis with the patient and or their family/guardian. I have answered their questions and given discharge instructions. They voiced understanding of these instructions and did not have any further questions or complaints. DIAGNOSTIC RESULTS     EKG: All EKG's are interpreted by the Emergency Department Physician who either signs or Co-signs this chart in the absence of a cardiologist.        RADIOLOGY:   I directly visualized the following  images and reviewed the radiologist interpretations:  No orders to display       No results found. LABS:  No results found for this visit on 01/09/20. EMERGENCY DEPARTMENT COURSE:     The patient was given the following medications:  Orders Placed This Encounter   Medications    amoxicillin (AMOXIL) capsule 500 mg    Amoxicillin 500 MG TABS     Sig: Take 500 mg by mouth 3 times daily for 7 days     Dispense:  21 tablet     Refill:  0        Vitals:    Vitals:    01/09/20 2125   BP: 130/82   Pulse: 97   Resp: 16   Temp: 98.1 °F (36.7 °C)   TempSrc: Oral   SpO2: 96%   Weight: 86.2 kg (190 lb)   Height: 5' 4\" (1.626 m)     -------------------------  BP: 130/82, Temp: 98.1 °F (36.7 °C), Pulse: 97, Resp: 16      Re-evaluation Notes              CONSULTS:    None    CRITICAL CARE:     None    PROCEDURES:    None    FINAL IMPRESSION      1.  Acute otitis media, unspecified otitis media type          DISPOSITION/PLAN   DISPOSITION Decision To Discharge 01/09/2020 09:40:05 PM      Condition on Disposition    Improved    PATIENT REFERRED TO:  DO Frank Malone [address]  [facility]  [address]  [phone]    Schedule an appointment as soon as possible for a visit in 3 days        DISCHARGE MEDICATIONS:  Discharge Medication List as of 1/9/2020  9:49 PM      START taking these medications    Details   Amoxicillin 500 MG TABS Take 500 mg by mouth 3 times daily for 7 days, Disp-21 tablet, R-0Print             (Please note that portions of this note were completed with a voice recognition program.  Efforts were made to edit the dictations but occasionally words are mis-transcribed.)    Anila Gomez DO  Attending Emergency Physician       Anila Gomez DO  01/10/20 9164

## 2020-12-18 ENCOUNTER — HOSPITAL ENCOUNTER (EMERGENCY)
Age: 55
Discharge: HOME | End: 2020-12-18
Attending: EMERGENCY MEDICINE
Payer: COMMERCIAL

## 2020-12-18 ENCOUNTER — APPOINTMENT (OUTPATIENT)
Dept: GENERAL RADIOLOGY | Age: 55
End: 2020-12-18
Payer: COMMERCIAL

## 2020-12-18 VITALS
SYSTOLIC BLOOD PRESSURE: 135 MMHG | HEIGHT: 64 IN | HEART RATE: 83 BPM | TEMPERATURE: 98.2 F | RESPIRATION RATE: 16 BRPM | WEIGHT: 195 LBS | DIASTOLIC BLOOD PRESSURE: 90 MMHG | OXYGEN SATURATION: 98 % | BODY MASS INDEX: 33.29 KG/M2

## 2020-12-18 PROCEDURE — 71101 X-RAY EXAM UNILAT RIBS/CHEST: CPT

## 2020-12-18 PROCEDURE — 6370000000 HC RX 637 (ALT 250 FOR IP): Performed by: EMERGENCY MEDICINE

## 2020-12-18 PROCEDURE — 99282 EMERGENCY DEPT VISIT SF MDM: CPT

## 2020-12-18 PROCEDURE — 96372 THER/PROPH/DIAG INJ SC/IM: CPT

## 2020-12-18 PROCEDURE — 6360000002 HC RX W HCPCS: Performed by: EMERGENCY MEDICINE

## 2020-12-18 RX ORDER — CYCLOBENZAPRINE HCL 10 MG
10 TABLET ORAL 3 TIMES DAILY PRN
Qty: 30 TABLET | Refills: 0 | Status: SHIPPED | OUTPATIENT
Start: 2020-12-18

## 2020-12-18 RX ORDER — HYDROCODONE BITARTRATE AND ACETAMINOPHEN 5; 325 MG/1; MG/1
2 TABLET ORAL ONCE
Status: COMPLETED | OUTPATIENT
Start: 2020-12-18 | End: 2020-12-18

## 2020-12-18 RX ORDER — KETOROLAC TROMETHAMINE 30 MG/ML
30 INJECTION, SOLUTION INTRAMUSCULAR; INTRAVENOUS ONCE
Status: COMPLETED | OUTPATIENT
Start: 2020-12-18 | End: 2020-12-18

## 2020-12-18 RX ORDER — HYDROCODONE BITARTRATE AND ACETAMINOPHEN 5; 325 MG/1; MG/1
1 TABLET ORAL EVERY 6 HOURS PRN
Qty: 15 TABLET | Refills: 0 | Status: SHIPPED | OUTPATIENT
Start: 2020-12-18 | End: 2020-12-25

## 2020-12-18 RX ADMIN — HYDROCODONE BITARTRATE AND ACETAMINOPHEN 2 TABLET: 5; 325 TABLET ORAL at 16:36

## 2020-12-18 RX ADMIN — KETOROLAC TROMETHAMINE 30 MG: 30 INJECTION, SOLUTION INTRAMUSCULAR at 16:36

## 2020-12-18 ASSESSMENT — PAIN SCALES - GENERAL
PAINLEVEL_OUTOF10: 7
PAINLEVEL_OUTOF10: 10
PAINLEVEL_OUTOF10: 10

## 2020-12-18 ASSESSMENT — PAIN DESCRIPTION - ORIENTATION: ORIENTATION: LEFT

## 2020-12-18 ASSESSMENT — PAIN DESCRIPTION - LOCATION: LOCATION: RIB CAGE

## 2020-12-18 ASSESSMENT — PAIN DESCRIPTION - PAIN TYPE: TYPE: ACUTE PAIN

## 2020-12-18 NOTE — ED PROVIDER NOTES
[id number] [facility] ED  [address] [address] [facility] [address] [facility] [address]  Phone: [phone]  Fax: [phone] [facility]      Pt Name: Keith Humphries  MRN: [id number]  [place] 1965  Date of evaluation: 12/18/2020    CHIEF COMPLAINT       Chief Complaint   Patient presents with    Rib Injury     left side rib pain from fall last night       HISTORY OF PRESENT ILLNESS    Keith Humphries is a 63 y.o. female who presents to the emergency department complaining of left rib pain following a fall that occurred last night. Stepping over the gate on her deck she fell onto her left ribs. She denies any head injury or loss of consciousness no other injuries. Pain worse with movement. She took some Tylenol last night otherwise nothing for pain today. Denies fevers she does admit to some difficulty breathing with regards to breathing and deep. Pain is 10/10. Sharp. REVIEW OF SYSTEMS       Constitutional: No fevers or chills   HEENT: No sore throat, rhinorrhea, or earache   Eyes: No blurry vision or double vision no drainage   Cardiovascular: positive rib pain no tachycardia  Respiratory: No wheezing positive shortness of breath with pain  Gastrointestinal: No nausea, vomiting, diarrhea, constipation, or abdominal pain   : No hematuria or dysuria   Musculoskeletal: No swelling or pain   Skin: No rash   Neurological: No focal neurologic complaints, paresthesias, weakness, or headache     PAST MEDICAL HISTORY    has a past medical history of Headache(784.0) and Psoriasis. SURGICAL HISTORY      has a past surgical history that includes Neck surgery; Cholecystectomy; joint replacement (Left); and Tubal ligation (Bilateral).     CURRENT MEDICATIONS       Previous Medications    CHOLECALCIFEROL (VITAMIN D3) 2000 UNITS CAPS    Take by mouth    GUSELKUMAB (TREMFYA SC)    Inject into the skin    IBUPROFEN (ADVIL;MOTRIN) 600 MG TABLET    Take 1 tablet by mouth every 6 hours as needed for Pain LEVOTHYROXINE (SYNTHROID) 25 MCG TABLET    Take 25 mcg by mouth Daily    PREDNISONE (DELTASONE) 50 MG TABLET    Take 1 tablet by mouth daily    SERTRALINE (ZOLOFT) 50 MG TABLET    Take 50 mg by mouth daily    SUMATRIPTAN SUCCINATE (IMITREX PO)    Take by mouth       ALLERGIES     has No Known Allergies. FAMILY HISTORY     has no family status information on file. family history is not on file. SOCIAL HISTORY      reports that she has never smoked. She has never used smokeless tobacco. She reports current alcohol use. She reports that she does not use drugs. PHYSICAL EXAM         Constitutional: Alert, oriented x3, nontoxic, answering questions appropriately, acting properly for age, in no acute distress appears uncomfortable  HEENT: Extraocular muscles intact,   Neck: Trachea midline   Cardiovascular: Regular rhythm and rate no S3, S4, or murmurs   Respiratory: Clear to auscultation bilaterally no wheezes, rhonchi, rales, no respiratory distress no tachypnea no retractions no hypoxia  Gastrointestinal: Soft, nontender, nondistended, positive bowel sounds. No rebound, rigidity, or guarding. Musculoskeletal: No extremity pain or swelling   Chest wall: Tenderness palpation left lateral ribs 8 through 10 no subcutaneous emphysema or ecchymosis. No rash. Neurologic: Moving all 4 extremities without difficulty there are no gross focal neurologic deficits   Skin: Warm and dry       DIFFERENTIAL DIAGNOSIS/ MDM:     Rib pain suspect fracture. Rule out pneumothorax. Pain control and x-ray. DIAGNOSTIC RESULTS     EKG: All EKG's are interpreted by the Emergency Department Physician who either signs or Co-signs this chart in the absence of a cardiologist.        Not indicated unless otherwise documented above    LABS:  No results found for this visit on 12/18/20.     Not indicated unless otherwise documented above    RADIOLOGY:   I reviewed the radiologist interpretations: XR RIBS LEFT INCLUDE CHEST (MIN 3 VIEWS)   Final Result   1. No acute cardiopulmonary disease. 2. No left rib fracture. Not indicated unless otherwise documented above    EMERGENCY DEPARTMENT COURSE:     The patient was given the following medications:  Orders Placed This Encounter   Medications    HYDROcodone-acetaminophen (NORCO) 5-325 MG per tablet 2 tablet    ketorolac (TORADOL) injection 30 mg    cyclobenzaprine (FLEXERIL) 10 MG tablet     Sig: Take 1 tablet by mouth 3 times daily as needed for Muscle spasms     Dispense:  30 tablet     Refill:  0    HYDROcodone-acetaminophen (NORCO) 5-325 MG per tablet     Sig: Take 1 tablet by mouth every 6 hours as needed for Pain for up to 7 days. WARNING:  May cause drowsiness. May impair ability to operate vehicles or machinery. Do not use in combination with alcohol. OARRS Reviewed: ICD-10-CM Diagnosis Code R52 : Pain     Dispense:  15 tablet     Refill:  0        Vitals:   -------------------------  BP (!) 135/90   Pulse 83   Temp 98.2 °F (36.8 °C) (Oral)   Resp 16   Ht 5' 4\" (1.626 m)   Wt 88.5 kg (195 lb)   LMP  (LMP Unknown)   SpO2 98%   BMI 33.47 kg/m²     5:15 PM x-ray no fracture or pneumothorax. Suspect rib contusion status post fall. Prescription for Flexeril and Vicodin no driving if taking. Heat as needed. Advise no binders. Return if worsening pain, fevers difficulty breathing or any other concerns. I have reviewed the disposition diagnosis with the patient and or their family/guardian. I have answered their questions and given discharge instructions. They voiced understanding of these instructions and did not have any furtherquestions or complaints. CRITICAL CARE:    None    CONSULTS:    None    PROCEDURES:    None      OARRS Report if indicated    Periodic Controlled Substance Monitoring: No signs of potential drug abuse or diversion identified.  (Virgil Hanks DO)        FINAL IMPRESSION 1. Fall, initial encounter    2. Rib contusion, left, initial encounter          DISPOSITION/PLAN   DISPOSITION Decision To Discharge 12/18/2020 05:18:22 PM        CONDITION ON DISPOSITION: STABLE       PATIENT REFERRED TO:  DO Frank Marin [address] [facility]  [phone]    Schedule an appointment as soon as possible for a visit in 1 week        DISCHARGE MEDICATIONS:  New Prescriptions    CYCLOBENZAPRINE (FLEXERIL) 10 MG TABLET    Take 1 tablet by mouth 3 times daily as needed for Muscle spasms    HYDROCODONE-ACETAMINOPHEN (NORCO) 5-325 MG PER TABLET    Take 1 tablet by mouth every 6 hours as needed for Pain for up to 7 days. WARNING:  May cause drowsiness. May impair ability to operate vehicles or machinery. Do not use in combination with alcohol.     OARRS Reviewed: ICD-10-CM Diagnosis Code R52 : Pain       (Please note that portions of thisnote were completed with a voice recognition program.  Efforts were made to edit the dictations but occasionally words are mis-transcribed.)    [place] DO  Attending Emergency Physician       John Ordoñez DO  12/18/20 [id number]

## 2020-12-18 NOTE — ED NOTES
Patient fell while climbing over a fence last evening causing left rib injury. Patient reports pain and difficulty taking deep breath.      Kenan Deras RN  12/18/20 028

## 2023-07-03 ENCOUNTER — HOSPITAL ENCOUNTER (EMERGENCY)
Age: 58
Discharge: HOME | End: 2023-07-03
Attending: EMERGENCY MEDICINE
Payer: COMMERCIAL

## 2023-07-03 ENCOUNTER — APPOINTMENT (OUTPATIENT)
Dept: GENERAL RADIOLOGY | Age: 58
End: 2023-07-03
Payer: COMMERCIAL

## 2023-07-03 VITALS
SYSTOLIC BLOOD PRESSURE: 116 MMHG | RESPIRATION RATE: 18 BRPM | TEMPERATURE: 98.1 F | DIASTOLIC BLOOD PRESSURE: 76 MMHG | OXYGEN SATURATION: 98 % | WEIGHT: 145 LBS | HEIGHT: 63 IN | BODY MASS INDEX: 25.69 KG/M2 | HEART RATE: 85 BPM

## 2023-07-03 DIAGNOSIS — R07.89 CHEST WALL PAIN: Primary | ICD-10-CM

## 2023-07-03 LAB
ALBUMIN SERPL-MCNC: 3.9 G/DL (ref 3.5–5.2)
ALBUMIN/GLOB SERPL: 1.3 {RATIO} (ref 1–2.5)
ALP SERPL-CCNC: 77 U/L (ref 35–104)
ALT SERPL-CCNC: 7 U/L (ref 5–33)
ANION GAP SERPL CALCULATED.3IONS-SCNC: 12 MMOL/L (ref 9–17)
AST SERPL-CCNC: 12 U/L
BACTERIA URNS QL MICRO: ABNORMAL
BASOPHILS # BLD: 0.1 K/UL (ref 0–0.2)
BASOPHILS NFR BLD: 1 % (ref 0–2)
BILIRUB SERPL-MCNC: 0.2 MG/DL (ref 0.3–1.2)
BILIRUB UR QL STRIP: NEGATIVE
BUN SERPL-MCNC: 10 MG/DL (ref 6–20)
CALCIUM SERPL-MCNC: 9.3 MG/DL (ref 8.6–10.4)
CHARACTER UR: ABNORMAL
CHLORIDE SERPL-SCNC: 105 MMOL/L (ref 98–107)
CLARITY UR: CLEAR
CO2 SERPL-SCNC: 25 MMOL/L (ref 20–31)
COLOR UR: YELLOW
CREAT SERPL-MCNC: 0.76 MG/DL (ref 0.5–0.9)
D DIMER PPP FEU-MCNC: 0.48 UG/ML FEU
EOSINOPHIL # BLD: 0.2 K/UL (ref 0–0.4)
EOSINOPHILS RELATIVE PERCENT: 3 % (ref 1–4)
EPI CELLS #/AREA URNS HPF: ABNORMAL /HPF (ref 0–5)
ERYTHROCYTE [DISTWIDTH] IN BLOOD BY AUTOMATED COUNT: 14.4 % (ref 12.5–15.4)
GFR SERPL CREATININE-BSD FRML MDRD: >60 ML/MIN/1.73M2
GLUCOSE SERPL-MCNC: 87 MG/DL (ref 70–99)
GLUCOSE UR STRIP-MCNC: NEGATIVE MG/DL
HCT VFR BLD AUTO: 41.4 % (ref 36–46)
HGB BLD-MCNC: 14 G/DL (ref 12–16)
HGB UR QL STRIP.AUTO: NEGATIVE
KETONES UR STRIP-MCNC: NEGATIVE MG/DL
LEUKOCYTE ESTERASE UR QL STRIP: ABNORMAL
LIPASE SERPL-CCNC: 68 U/L (ref 13–60)
LYMPHOCYTES # BLD: 22 % (ref 24–44)
LYMPHOCYTES NFR BLD: 1.5 K/UL (ref 1–4.8)
MAGNESIUM SERPL-MCNC: 2.1 MG/DL (ref 1.6–2.6)
MCH RBC QN AUTO: 30.6 PG (ref 26–34)
MCHC RBC AUTO-ENTMCNC: 33.7 G/DL (ref 31–37)
MCV RBC AUTO: 90.7 FL (ref 80–100)
MONOCYTES NFR BLD: 0.5 K/UL (ref 0.1–1.2)
MONOCYTES NFR BLD: 7 % (ref 2–11)
NEUTROPHILS NFR BLD: 67 % (ref 36–66)
NEUTS SEG NFR BLD: 4.4 K/UL (ref 1.8–7.7)
NITRITE UR QL STRIP: NEGATIVE
PH UR STRIP: 7.5 [PH] (ref 5–8)
PLATELET # BLD AUTO: 240 K/UL (ref 140–450)
PMV BLD AUTO: 7.7 FL (ref 6–12)
POTASSIUM SERPL-SCNC: 4.1 MMOL/L (ref 3.7–5.3)
PROT SERPL-MCNC: 6.8 G/DL (ref 6.4–8.3)
PROT UR STRIP-MCNC: NEGATIVE MG/DL
RBC # BLD AUTO: 4.57 M/UL (ref 4–5.2)
RBC #/AREA URNS HPF: ABNORMAL /HPF (ref 0–2)
SODIUM SERPL-SCNC: 142 MMOL/L (ref 135–144)
SP GR UR STRIP: 1.01 (ref 1–1.03)
TROPONIN I SERPL HS-MCNC: <6 NG/L (ref 0–14)
UROBILINOGEN UR STRIP-ACNC: NORMAL
WBC #/AREA URNS HPF: ABNORMAL /HPF (ref 0–5)
WBC OTHER # BLD: 6.6 K/UL (ref 3.5–11)

## 2023-07-03 PROCEDURE — 83735 ASSAY OF MAGNESIUM: CPT

## 2023-07-03 PROCEDURE — 84484 ASSAY OF TROPONIN QUANT: CPT

## 2023-07-03 PROCEDURE — 36415 COLL VENOUS BLD VENIPUNCTURE: CPT

## 2023-07-03 PROCEDURE — 71045 X-RAY EXAM CHEST 1 VIEW: CPT

## 2023-07-03 PROCEDURE — 6360000002 HC RX W HCPCS: Performed by: EMERGENCY MEDICINE

## 2023-07-03 PROCEDURE — 81001 URINALYSIS AUTO W/SCOPE: CPT

## 2023-07-03 PROCEDURE — 85379 FIBRIN DEGRADATION QUANT: CPT

## 2023-07-03 PROCEDURE — 99285 EMERGENCY DEPT VISIT HI MDM: CPT

## 2023-07-03 PROCEDURE — 80053 COMPREHEN METABOLIC PANEL: CPT

## 2023-07-03 PROCEDURE — 85027 COMPLETE CBC AUTOMATED: CPT

## 2023-07-03 PROCEDURE — 83690 ASSAY OF LIPASE: CPT

## 2023-07-03 RX ORDER — KETOROLAC TROMETHAMINE 30 MG/ML
30 INJECTION, SOLUTION INTRAMUSCULAR; INTRAVENOUS ONCE
Status: COMPLETED | OUTPATIENT
Start: 2023-07-03 | End: 2023-07-03

## 2023-07-03 RX ORDER — NAPROXEN 500 MG/1
500 TABLET ORAL 2 TIMES DAILY WITH MEALS
Qty: 30 TABLET | Refills: 0 | Status: SHIPPED | OUTPATIENT
Start: 2023-07-03 | End: 2023-07-18

## 2023-07-03 RX ADMIN — KETOROLAC TROMETHAMINE 30 MG: 30 INJECTION, SOLUTION INTRAMUSCULAR; INTRAVENOUS at 11:04

## 2023-07-03 ASSESSMENT — ENCOUNTER SYMPTOMS
VOMITING: 0
RHINORRHEA: 0
SHORTNESS OF BREATH: 0
NAUSEA: 0
BACK PAIN: 0
ABDOMINAL PAIN: 0
SORE THROAT: 0
DIARRHEA: 0
EYE PAIN: 0
COUGH: 0

## 2023-07-03 ASSESSMENT — PAIN DESCRIPTION - LOCATION: LOCATION: CHEST

## 2023-07-03 ASSESSMENT — PAIN - FUNCTIONAL ASSESSMENT: PAIN_FUNCTIONAL_ASSESSMENT: 0-10

## 2023-07-03 ASSESSMENT — PAIN SCALES - GENERAL: PAINLEVEL_OUTOF10: 7

## 2023-07-03 NOTE — ED PROVIDER NOTES
feeling of passing out, fever, any neurologic symptoms, abdominal pain or vomiting) that necessitate immediate return. The patient understands that at this time there is no evidence for a more malignant underlying process, but the patient also understands that early in the process of an illness or injury, an emergency department workup can be falsely reassuring. Routine discharge counseling was given, and the patient understands that worsening, changing or persistent symptoms should prompt an immediate call or follow up with their primary physician or return to the emergency department. The importance of appropriate follow up was also discussed. I have reviewed the disposition diagnosis with the patient and or their family/guardian. I have answered their questions and given discharge instructions. They voiced understanding of these instructions and did not have any further questions or complaints. CONSULTS:    None    CRITICAL CARE:     None    PROCEDURES:    None    FINAL IMPRESSION      1.  Chest wall pain          DISPOSITION/PLAN   DISPOSITION Decision To Discharge 07/03/2023 11:31:27 AM      Condition on Disposition    Improved    PATIENT REFERRED TO:  Yovanny Eason DO  [address] [place], [place] [address] [phone]    Schedule an appointment as soon as possible for a visit in 2 days        DISCHARGE MEDICATIONS:  Discharge Medication List as of 7/3/2023 11:32 AM        START taking these medications    Details   naproxen (NAPROSYN) 500 MG tablet Take 1 tablet by mouth 2 times daily (with meals) for 30 doses, Disp-30 tablet, R-0Normal             (Please note that portions of this note were completed with a voice recognition program.  Efforts were made to edit the dictations but occasionally words are mis-transcribed.)    Eduardo Lee DO  Attending Emergency Physician       Eduardo Lee DO  07/04/23 2941

## 2023-07-03 NOTE — DISCHARGE INSTRUCTIONS
PLEASE RETURN TO THE EMERGENCY DEPARTMENT IMMEDIATELY if your symptoms worsen in anyway or in 8-12 hours if not improved for re-evaluation. You should immediately return to the ER for symptoms such as increasing pain, shortness of breath, fever, a feeling of passing out, light headed, dizziness, abdominal pain, numbness or weakness to the arms or legs, coolness or color change of the arms or legs. Take your medication as indicated and prescribed. If you are given an antibiotic then, make sure you get the prescription filled and take the antibiotics until finished. Please understand that at this time there is no evidence for a more serious underlying process, but that early in the process of an illness or injury, an emergency department workup can be falsely reassuring. You should contact your family doctor within the next 24 hours for a follow up appointment    [address] [facility]!!!    From [facility] ([facility]) and [facility] Emergency Services    On behalf of the Emergency Department staff at [facility]), I would like to thank you for giving us the opportunity to address your health care needs and concerns. We hope that during your visit, our service was delivered in a professional and caring manner. Please keep [facility] ([facility]) in mind as we walk with you down the path to your own personal wellness. Please expect an automated text message or email from us so we can ask a few questions about your health and progress. Based on your answers, a clinician may call you back to offer help and instructions. Please understand that early in the process of an illness or injury, an emergency department workup can be falsely reassuring. If you notice any worsening, changing or persistent symptoms please call your family doctor or return to the ER immediately. Tell us how we did during your visit at [email or web address]   and let us know about your experience

## 2023-07-09 LAB
EKG ATRIAL RATE: 87 BPM
EKG P AXIS: 68 DEGREES
EKG P-R INTERVAL: 150 MS
EKG Q-T INTERVAL: 346 MS
EKG QRS DURATION: 78 MS
EKG QTC CALCULATION (BAZETT): 416 MS
EKG R AXIS: 77 DEGREES
EKG T AXIS: 60 DEGREES
EKG VENTRICULAR RATE: 87 BPM

## 2023-11-22 ENCOUNTER — HOSPITAL ENCOUNTER (EMERGENCY)
Age: 58
Discharge: HOME | End: 2023-11-22
Attending: EMERGENCY MEDICINE
Payer: COMMERCIAL

## 2023-11-22 VITALS
OXYGEN SATURATION: 95 % | HEIGHT: 63 IN | RESPIRATION RATE: 16 BRPM | HEART RATE: 106 BPM | SYSTOLIC BLOOD PRESSURE: 126 MMHG | WEIGHT: 140 LBS | DIASTOLIC BLOOD PRESSURE: 76 MMHG | BODY MASS INDEX: 24.8 KG/M2 | TEMPERATURE: 98.8 F

## 2023-11-22 DIAGNOSIS — G43.909 MIGRAINE WITHOUT STATUS MIGRAINOSUS, NOT INTRACTABLE, UNSPECIFIED MIGRAINE TYPE: Primary | ICD-10-CM

## 2023-11-22 PROCEDURE — 99284 EMERGENCY DEPT VISIT MOD MDM: CPT

## 2023-11-22 PROCEDURE — 96374 THER/PROPH/DIAG INJ IV PUSH: CPT

## 2023-11-22 PROCEDURE — 96375 TX/PRO/DX INJ NEW DRUG ADDON: CPT

## 2023-11-22 PROCEDURE — 2580000003 HC RX 258: Performed by: EMERGENCY MEDICINE

## 2023-11-22 PROCEDURE — 6360000002 HC RX W HCPCS: Performed by: EMERGENCY MEDICINE

## 2023-11-22 RX ORDER — DIPHENHYDRAMINE HYDROCHLORIDE 50 MG/ML
25 INJECTION INTRAMUSCULAR; INTRAVENOUS ONCE
Status: COMPLETED | OUTPATIENT
Start: 2023-11-22 | End: 2023-11-22

## 2023-11-22 RX ORDER — PROCHLORPERAZINE EDISYLATE 5 MG/ML
5 INJECTION INTRAMUSCULAR; INTRAVENOUS ONCE
Status: COMPLETED | OUTPATIENT
Start: 2023-11-22 | End: 2023-11-22

## 2023-11-22 RX ORDER — DEXAMETHASONE SODIUM PHOSPHATE 10 MG/ML
6 INJECTION, SOLUTION INTRAMUSCULAR; INTRAVENOUS ONCE
Status: COMPLETED | OUTPATIENT
Start: 2023-11-22 | End: 2023-11-22

## 2023-11-22 RX ORDER — 0.9 % SODIUM CHLORIDE 0.9 %
1000 INTRAVENOUS SOLUTION INTRAVENOUS ONCE
Status: COMPLETED | OUTPATIENT
Start: 2023-11-22 | End: 2023-11-22

## 2023-11-22 RX ORDER — KETOROLAC TROMETHAMINE 30 MG/ML
30 INJECTION, SOLUTION INTRAMUSCULAR; INTRAVENOUS ONCE
Status: COMPLETED | OUTPATIENT
Start: 2023-11-22 | End: 2023-11-22

## 2023-11-22 RX ADMIN — DEXAMETHASONE SODIUM PHOSPHATE 6 MG: 10 INJECTION, SOLUTION INTRAMUSCULAR; INTRAVENOUS at 08:56

## 2023-11-22 RX ADMIN — SODIUM CHLORIDE 1000 ML: 9 INJECTION, SOLUTION INTRAVENOUS at 09:01

## 2023-11-22 RX ADMIN — DIPHENHYDRAMINE HYDROCHLORIDE 25 MG: 50 INJECTION INTRAMUSCULAR; INTRAVENOUS at 08:58

## 2023-11-22 RX ADMIN — PROCHLORPERAZINE EDISYLATE 5 MG: 5 INJECTION INTRAMUSCULAR; INTRAVENOUS at 08:46

## 2023-11-22 RX ADMIN — KETOROLAC TROMETHAMINE 30 MG: 30 INJECTION, SOLUTION INTRAMUSCULAR; INTRAVENOUS at 08:52

## 2023-11-22 ASSESSMENT — LIFESTYLE VARIABLES
HOW MANY STANDARD DRINKS CONTAINING ALCOHOL DO YOU HAVE ON A TYPICAL DAY: 1 OR 2
HOW OFTEN DO YOU HAVE A DRINK CONTAINING ALCOHOL: 2-4 TIMES A MONTH

## 2023-11-22 ASSESSMENT — PAIN DESCRIPTION - ORIENTATION
ORIENTATION: ANTERIOR
ORIENTATION: ANTERIOR;LEFT;RIGHT
ORIENTATION: ANTERIOR;LEFT;RIGHT

## 2023-11-22 ASSESSMENT — PAIN - FUNCTIONAL ASSESSMENT
PAIN_FUNCTIONAL_ASSESSMENT: 0-10
PAIN_FUNCTIONAL_ASSESSMENT: 0-10

## 2023-11-22 ASSESSMENT — PAIN DESCRIPTION - LOCATION
LOCATION: HEAD

## 2023-11-22 ASSESSMENT — ENCOUNTER SYMPTOMS
VOMITING: 0
SORE THROAT: 0
EYE PAIN: 0
BACK PAIN: 0
RHINORRHEA: 0
COUGH: 0
NAUSEA: 0
ABDOMINAL PAIN: 0
SHORTNESS OF BREATH: 0
DIARRHEA: 0

## 2023-11-22 ASSESSMENT — PAIN SCALES - GENERAL
PAINLEVEL_OUTOF10: 10
PAINLEVEL_OUTOF10: 10
PAINLEVEL_OUTOF10: 5

## 2023-11-22 ASSESSMENT — PAIN DESCRIPTION - FREQUENCY: FREQUENCY: CONTINUOUS

## 2023-11-22 NOTE — DISCHARGE INSTRUCTIONS
PLEASE RETURN TO THE EMERGENCY DEPARTMENT IMMEDIATELY if your symptoms worsen in anyway or in 8-12 hours if not improved for re-evaluation. You should immediately return to the ER for symptoms such as new or worsening pain, fever, visual or hearing changes, stiff neck, rash, a feeling of passing out, chest pain, shortness of breath, persistent nausea and/or vomiting, numbness or weakness to the arms or legs, coolness or color change of the arms or legs. Take your medication as indicated and prescribed. If you are given an antibiotic then, make sure you get the prescription filled and take the antibiotics until finished. Please understand that at this time there is no evidence for a more serious underlying process, but that early in the process of an illness or injury, an emergency department workup can be falsely reassuring. You should contact your family doctor within the next 24 hours for a follow up appointment    [address] [facility]!!!    From [facility] ([facility]) and [facility] Emergency Services    On behalf of the Emergency Department staff at [facility]), I would like to thank you for giving us the opportunity to address your health care needs and concerns. We hope that during your visit, our service was delivered in a professional and caring manner. Please keep [facility]) in mind as we walk with you down the path to your own personal wellness. Please expect an automated text message or email from us so we can ask a few questions about your health and progress. Based on your answers, a clinician may call you back to offer help and instructions. Please understand that early in the process of an illness or injury, an emergency department workup can be falsely reassuring. If you notice any worsening, changing or persistent symptoms please call your family doctor or return to the ER immediately. Tell us how we did during your visit at [email or web address]   and let us know about your

## 2023-11-22 NOTE — ED NOTES
Pt resting comfortably in bed, warm blanket provided, lights dimmed, call light within reach.   Pt  at bedside     Cameron Banerjeeper  11/22/23 9506

## 2024-12-10 ENCOUNTER — OFFICE VISIT (OUTPATIENT)
Dept: OBGYN CLINIC | Age: 59
End: 2024-12-10

## 2024-12-10 ENCOUNTER — HOSPITAL ENCOUNTER (OUTPATIENT)
Age: 59
Setting detail: SPECIMEN
Discharge: HOME | End: 2024-12-10

## 2024-12-10 VITALS
BODY MASS INDEX: 28.35 KG/M2 | SYSTOLIC BLOOD PRESSURE: 130 MMHG | WEIGHT: 160 LBS | DIASTOLIC BLOOD PRESSURE: 70 MMHG | HEIGHT: 63 IN

## 2024-12-10 DIAGNOSIS — Z01.419 ENCOUNTER FOR GYNECOLOGICAL EXAMINATION WITHOUT ABNORMAL FINDING: ICD-10-CM

## 2024-12-10 DIAGNOSIS — Z12.31 VISIT FOR SCREENING MAMMOGRAM: Primary | ICD-10-CM

## 2024-12-10 RX ORDER — SEMAGLUTIDE 2.4 MG/.75ML
2.4 INJECTION, SOLUTION SUBCUTANEOUS
COMMUNITY
Start: 2022-12-01

## 2024-12-10 RX ORDER — IXEKIZUMAB 80 MG/ML
INJECTION, SOLUTION SUBCUTANEOUS
COMMUNITY
Start: 2022-12-01

## 2024-12-10 ASSESSMENT — PATIENT HEALTH QUESTIONNAIRE - PHQ9
SUM OF ALL RESPONSES TO PHQ QUESTIONS 1-9: 0
SUM OF ALL RESPONSES TO PHQ QUESTIONS 1-9: 0
SUM OF ALL RESPONSES TO PHQ9 QUESTIONS 1 & 2: 0
SUM OF ALL RESPONSES TO PHQ QUESTIONS 1-9: 0
SUM OF ALL RESPONSES TO PHQ QUESTIONS 1-9: 0
2. FEELING DOWN, DEPRESSED OR HOPELESS: NOT AT ALL
1. LITTLE INTEREST OR PLEASURE IN DOING THINGS: NOT AT ALL

## 2024-12-10 NOTE — PATIENT INSTRUCTIONS
Please remember to get your mammogram done.  We will let you know that result as well as today's Pap.  Return in 1 year  Happy upcoming 60st birthday and happy holidays!

## 2024-12-10 NOTE — PROGRESS NOTES
Physical Exam: Chaperone present.  Vitals:    12/10/24 1003   BP: 130/70   Site: Right Upper Arm   Position: Sitting   Cuff Size: Medium Adult   Weight: 72.6 kg (160 lb)   Height: 1.6 m (5' 3\")        General Appearance:  She does not appear to be in any distress.  This  is a well developed, well nourished, well groomed female.        Neurological:  The patient is alert and oriented to time, place, person, and situation without any noted sensory motor deficits.    Skin:  A brief inspection of the skin revealed no rashes or lesions.     Neck:  The neck was supple.  There is no tracheal deviation, thyromegaly or supraclavicular adenopathy appreciated.    Breast:   The patients breasts were symmetrical.  Breasts are nontender and there  were no masses, discharge or pathologic skin changes.   There is no supraclavicular or axillary adenopathy bilaterally.    Respiratory:  There was unlabored respiratory effort. Lungs clear to ascultation without wheezes, rales or rhonchi in all fields bilaterally.    Cardiovascular:  Normal sinus rhythm with a regular rate and without murmur, rubs or gallops.    Abdomen:  The abdomen was soft and non-tender with no guarding, rebound, CVAT or rigidity.  No hernias were appreciated.  Bowel sounds were normally active.    Pelvic exam:  No vulvar, vaginal or cervical lesions are noted.  Normal vaginal discharge present, no significant cystocele, rectocele or enterocele noted.  Uterus nongravid and without CMT and adnexa nontender and without abnormal masses bilaterally.  Rectum without external lesions noted.    Extremities:  FROM and nontender without clubbing cyanosis or edema.     ASSESSMENT:         ICD-10-CM    1. Visit for screening mammogram  Z12.31       2. Encounter for gynecological examination without abnormal finding  Z01.419

## 2024-12-19 LAB — CYTOLOGY REPORT: NORMAL

## 2025-02-11 DIAGNOSIS — Z12.31 VISIT FOR SCREENING MAMMOGRAM: ICD-10-CM

## 2025-07-10 ENCOUNTER — OFFICE VISIT (OUTPATIENT)
Dept: OBGYN CLINIC | Age: 60
End: 2025-07-10
Payer: COMMERCIAL

## 2025-07-10 VITALS
HEIGHT: 63 IN | BODY MASS INDEX: 29.41 KG/M2 | WEIGHT: 166 LBS | SYSTOLIC BLOOD PRESSURE: 118 MMHG | DIASTOLIC BLOOD PRESSURE: 80 MMHG

## 2025-07-10 DIAGNOSIS — C18.9 MALIGNANT NEOPLASM OF COLON, UNSPECIFIED PART OF COLON (HCC): ICD-10-CM

## 2025-07-10 DIAGNOSIS — Z76.89 ENCOUNTER TO ESTABLISH CARE: Primary | ICD-10-CM

## 2025-07-10 PROCEDURE — G8427 DOCREV CUR MEDS BY ELIG CLIN: HCPCS | Performed by: OBSTETRICS & GYNECOLOGY

## 2025-07-10 PROCEDURE — 1036F TOBACCO NON-USER: CPT | Performed by: OBSTETRICS & GYNECOLOGY

## 2025-07-10 PROCEDURE — 99214 OFFICE O/P EST MOD 30 MIN: CPT | Performed by: OBSTETRICS & GYNECOLOGY

## 2025-07-10 PROCEDURE — G8419 CALC BMI OUT NRM PARAM NOF/U: HCPCS | Performed by: OBSTETRICS & GYNECOLOGY

## 2025-07-10 PROCEDURE — 3017F COLORECTAL CA SCREEN DOC REV: CPT | Performed by: OBSTETRICS & GYNECOLOGY

## 2025-07-10 SDOH — ECONOMIC STABILITY: FOOD INSECURITY: WITHIN THE PAST 12 MONTHS, THE FOOD YOU BOUGHT JUST DIDN'T LAST AND YOU DIDN'T HAVE MONEY TO GET MORE.: NEVER TRUE

## 2025-07-10 SDOH — ECONOMIC STABILITY: FOOD INSECURITY: WITHIN THE PAST 12 MONTHS, YOU WORRIED THAT YOUR FOOD WOULD RUN OUT BEFORE YOU GOT MONEY TO BUY MORE.: NEVER TRUE

## 2025-07-15 NOTE — PROGRESS NOTES
Mckenna Bhatti  7/15/2025    YOB: 1965    The patient was seen today. She is here regarding establishing care. Her bowels are regular and she is voiding without difficulty.     HPI:  Mckenna Bhatti is a 60 y.o. female     Pap 2024  Mammogram     Pt establishing care as her GYN retired in March.    She was recently diagnosed with stage 3 colon cancer.  She had resection .  She had 7 lymph nodes with metastasis.  She was noted to have small simple cyst on left ovary incidentally.  2.4x2.1x1.5cm simple cyst on left ovary.  12 month follow up recommended.  She has started chemotherapy.    On  she was seen at [facility] and transferred to [facility] with minor brain bleed.    No adnexal mass noted 25.      She is seeing Dr. Diaz and encouraged and reassured her we will be available for any GYN care we can provide or for any assistance.          OB History   No obstetric history on file.       Past Medical History:   Diagnosis Date    Headache(784.0)     Psoriasis        Past Surgical History:   Procedure Laterality Date    CHOLECYSTECTOMY      JOINT REPLACEMENT Left     NECK SURGERY      TUBAL LIGATION Bilateral        No family history on file.    Social History     Socioeconomic History    Marital status:      Spouse name: Not on file    Number of children: Not on file    Years of education: Not on file    Highest education level: Not on file   Occupational History    Not on file   Tobacco Use    Smoking status: Never    Smokeless tobacco: Never   Substance and Sexual Activity    Alcohol use: Yes     Comment: rarely    Drug use: No    Sexual activity: Not on file   Other Topics Concern    Not on file   Social History Narrative    Not on file     Social Drivers of Health     Financial Resource Strain: Low Risk  (2024)    Received from [employer]    Overall Financial Resource Strain (CARDIA)     Difficulty of Paying Living Expenses: Not